# Patient Record
Sex: FEMALE | URBAN - METROPOLITAN AREA
[De-identification: names, ages, dates, MRNs, and addresses within clinical notes are randomized per-mention and may not be internally consistent; named-entity substitution may affect disease eponyms.]

---

## 2021-02-01 ENCOUNTER — TELEPHONE (OUTPATIENT)
Dept: CARDIOLOGY CLINIC | Age: 46
End: 2021-02-01

## 2021-02-01 NOTE — TELEPHONE ENCOUNTER
Pt calling wanting to make a New pt appt with AISHA in the [facility] office for fu from a Heart attack she had under COVID-19, Afib, and PVC's. Constanza Red or Mara Keenan can you please call pt to [facility]?   Pls call to advise Thank you